# Patient Record
Sex: MALE | Race: WHITE | NOT HISPANIC OR LATINO
[De-identification: names, ages, dates, MRNs, and addresses within clinical notes are randomized per-mention and may not be internally consistent; named-entity substitution may affect disease eponyms.]

---

## 2021-01-29 ENCOUNTER — TRANSCRIPTION ENCOUNTER (OUTPATIENT)
Age: 31
End: 2021-01-29

## 2024-05-13 ENCOUNTER — APPOINTMENT (OUTPATIENT)
Dept: ORTHOPEDIC SURGERY | Facility: CLINIC | Age: 34
End: 2024-05-13
Payer: COMMERCIAL

## 2024-05-13 VITALS — HEIGHT: 66 IN | BODY MASS INDEX: 25.71 KG/M2 | WEIGHT: 160 LBS

## 2024-05-13 DIAGNOSIS — Z87.891 PERSONAL HISTORY OF NICOTINE DEPENDENCE: ICD-10-CM

## 2024-05-13 DIAGNOSIS — S69.90XA UNSPECIFIED INJURY OF UNSPECIFIED WRIST, HAND AND FINGER(S), INITIAL ENCOUNTER: ICD-10-CM

## 2024-05-13 DIAGNOSIS — Z78.9 OTHER SPECIFIED HEALTH STATUS: ICD-10-CM

## 2024-05-13 PROBLEM — Z00.00 ENCOUNTER FOR PREVENTIVE HEALTH EXAMINATION: Status: ACTIVE | Noted: 2024-05-13

## 2024-05-13 PROCEDURE — 99203 OFFICE O/P NEW LOW 30 MIN: CPT

## 2024-05-17 PROBLEM — S69.90XA HAND INJURY: Status: ACTIVE | Noted: 2024-05-17

## 2024-05-17 NOTE — ASSESSMENT
[FreeTextEntry1] : EXAM Right wrist with mild swelling; no erythema; no ecchymosis. +ttp at dorsal wrist. Able to make a composite fist, oppose thumb to small finger and abduct fingers. <2sec cap refill.  Right wrist radiographs with no fracture nor dislocation. Carpus alignment intact. (3-view)  ASSESSMENT & PLAN Right wrist sprain - reviewed radiographs and pathoanatomy with the patient. Discussed management to consist of NSAIDs prn, wrist brace prn, elevation, minimize use.   F/u 4 weeks to reassess

## 2024-05-17 NOTE — HISTORY OF PRESENT ILLNESS
[de-identified] : Age: 34 year M PMHx: none Hand Dominance: RHD Chief Complaint: Right hand pain onset 05/11/24. Patient reports that he woke up with his pain on the morning of 05/11/24, prompting him to get evaluated. Patient reports that his pain was initially constant and throbbing, making it difficult to perform ADL's such as opening a doorknob or any other twisting motions. Patient went to [facility] 05/11/24 and had radiographs performed that were benign. Patient reports some minimal numbness/tingling. Trauma: NKI Outside Imaging/Treatment: radiographs from [facility] 05/11/24 OTC Medications: Motrin PRN OT/PT: none Bracing: none Pain worse with: ADL's, twisting motions Pain better with: rest

## 2024-06-03 ENCOUNTER — APPOINTMENT (OUTPATIENT)
Dept: ORTHOPEDIC SURGERY | Facility: CLINIC | Age: 34
End: 2024-06-03

## 2025-06-17 ENCOUNTER — APPOINTMENT (OUTPATIENT)
Dept: ORTHOPEDIC SURGERY | Facility: CLINIC | Age: 35
End: 2025-06-17
Payer: COMMERCIAL

## 2025-06-17 VITALS — BODY MASS INDEX: 26.52 KG/M2 | WEIGHT: 165 LBS | HEIGHT: 66 IN

## 2025-06-17 PROBLEM — M25.561 ACUTE PAIN OF RIGHT KNEE: Status: ACTIVE | Noted: 2025-06-17

## 2025-06-17 PROBLEM — D76.3 ROSAI-DORFMAN DISEASE: Status: RESOLVED | Noted: 2025-06-17 | Resolved: 2025-06-17

## 2025-06-17 PROCEDURE — 99203 OFFICE O/P NEW LOW 30 MIN: CPT

## 2025-06-17 PROCEDURE — 73562 X-RAY EXAM OF KNEE 3: CPT | Mod: RT

## 2025-07-29 ENCOUNTER — APPOINTMENT (OUTPATIENT)
Dept: ORTHOPEDIC SURGERY | Facility: CLINIC | Age: 35
End: 2025-07-29
Payer: COMMERCIAL

## 2025-07-29 DIAGNOSIS — M23.91 UNSPECIFIED INTERNAL DERANGEMENT OF RIGHT KNEE: ICD-10-CM

## 2025-07-29 PROCEDURE — 99213 OFFICE O/P EST LOW 20 MIN: CPT
